# Patient Record
Sex: MALE | Race: WHITE | NOT HISPANIC OR LATINO | URBAN - METROPOLITAN AREA
[De-identification: names, ages, dates, MRNs, and addresses within clinical notes are randomized per-mention and may not be internally consistent; named-entity substitution may affect disease eponyms.]

---

## 2020-03-09 ENCOUNTER — PATIENT MESSAGE (OUTPATIENT)
Dept: ADMINISTRATIVE | Facility: OTHER | Age: 50
End: 2020-03-09

## 2020-03-09 ENCOUNTER — CLINICAL SUPPORT (OUTPATIENT)
Dept: OTHER | Facility: OTHER | Age: 50
End: 2020-03-09

## 2020-03-09 VITALS
DIASTOLIC BLOOD PRESSURE: 90 MMHG | HEIGHT: 78 IN | WEIGHT: 315 LBS | SYSTOLIC BLOOD PRESSURE: 140 MMHG | BODY MASS INDEX: 36.45 KG/M2

## 2020-03-09 DIAGNOSIS — I10 ESSENTIAL HYPERTENSION: Chronic | ICD-10-CM

## 2020-03-09 PROBLEM — E78.5 DYSLIPIDEMIA: Status: ACTIVE | Noted: 2018-04-11

## 2020-03-09 PROBLEM — Z72.0 TOBACCO ABUSE: Status: ACTIVE | Noted: 2018-04-11

## 2020-03-09 RX ORDER — ATORVASTATIN CALCIUM 40 MG/1
40 TABLET, FILM COATED ORAL DAILY
COMMUNITY
Start: 2020-02-07 | End: 2020-08-05

## 2020-03-09 RX ORDER — LOSARTAN POTASSIUM 100 MG/1
100 TABLET ORAL DAILY
COMMUNITY
Start: 2020-02-07 | End: 2020-08-05

## 2020-03-09 RX ORDER — AMLODIPINE BESYLATE 10 MG/1
10 TABLET ORAL DAILY
COMMUNITY
Start: 2020-02-07 | End: 2020-08-05

## 2020-03-09 RX ORDER — HYDROCODONE BITARTRATE AND ACETAMINOPHEN 10; 325 MG/1; MG/1
1 TABLET ORAL EVERY 4 HOURS PRN
COMMUNITY

## 2020-03-09 NOTE — PROGRESS NOTES
Digital Medicine: Video Consult    2020  48 yo male presents for enrollment into [facility]. States diagnosed a few years ago and SBP usually in 140s.  Smoker of 1 ppd, not interested in quitting. Only exercise is work.  Uses energy drinks a few times per week.  Recently established with Dr. Shlomo Soriano in [place].      Hypertension Consult   This is a chronic problem. The current episode started more than 1 year ago. The problem is unchanged. The problem is uncontrolled. There are no associated agents to hypertension. Risk factors for coronary artery disease include family history, dyslipidemia, male gender, obesity and smoking/tobacco exposure. Past treatments include angiotensin blockers and calcium channel blockers. The current treatment provides mild improvement.       Patient Active Problem List   Diagnosis    Essential hypertension    Dyslipidemia    Tobacco abuse       Past Medical History:   Diagnosis Date    Hyperlipidemia     Hypertension     Obesity     Sleep apnea     not currently on CPAP       Family History   Problem Relation Age of Onset    Hypertension Mother     Lupus Mother     Coronary artery disease Father          at age 71 of [place]    No Known Problems Daughter     No Known Problems Son        Social History     Socioeconomic History    Marital status:      Spouse name: Not on file    Number of children: Not on file    Years of education: Not on file    Highest education level: Not on file   Occupational History    Not on file   Social Needs    Financial resource strain: Not hard at all    Food insecurity:     Worry: Not on file     Inability: Not on file    Transportation needs:     Medical: Not on file     Non-medical: Not on file   Tobacco Use    Smoking status: Current Every Day Smoker     Types: Cigarettes    Smokeless tobacco: Never Used   Substance and Sexual Activity    Alcohol use: Yes     Frequency: Monthly or less    Drug use: Never    Sexual  activity: Not on file   Lifestyle    Physical activity:     Days per week: 0 days     Minutes per session: Not on file    Stress: Not on file   Relationships    Social connections:     Talks on phone: Not on file     Gets together: Not on file     Attends [employer] service: Not on file     Active member of club or organization: Not on file     Attends meetings of clubs or organizations: Not on file     Relationship status: Not on file   Other Topics Concern    Not on file   Social History Narrative    Not on file       Review of patient's allergies indicates:  No Known Allergies      Current Outpatient Medications:     amLODIPine (NORVASC) 10 MG tablet, Take 10 mg by mouth once daily., Disp: , Rfl:     atorvastatin (LIPITOR) 40 MG tablet, Take 40 mg by mouth once daily., Disp: , Rfl:     HYDROcodone-acetaminophen (NORCO)  mg per tablet, Take 1 tablet by mouth every 4 (four) hours as needed., Disp: , Rfl:     losartan (COZAAR) 100 MG tablet, Take 100 mg by mouth once daily., Disp: , Rfl:     oxyMORphone (OPANA ER) 20 mg TR12, Take 1 tablet by mouth 3 (three) times daily., Disp: , Rfl:         Patient is eligible for enrollment.  Enrollment completed.            Assessment & Plan      Essential Hypertension I10  Patient to be enrolled in the Hypertension Digital Medicine Program.   - An [employer] digital blood pressure cuff will be provided for at-home use.  Patient will be trained to use the device and start recording the blood pressure readings.   - With each blood pressure measurement, the patient's data automatically integrates with [employer], [facility]'s electronic medical record system.    - The patient's Care Team monitors the results and intervenes with medication or lifestyle modifications as needed.   - The patient and his/her physician (if applicable) receive customized monthly reports to track progress.

## 2020-04-02 ENCOUNTER — PATIENT OUTREACH (OUTPATIENT)
Dept: OTHER | Facility: OTHER | Age: 50
End: 2020-04-02

## 2020-04-02 NOTE — LETTER
May 5, 2020     Miguel Angel Crowell  [address]  [place] [address]       Dear Miguel Angel,    [facility] Our goal is to make care effective, proactive and convenient by using data you send us from home to better treat your chronic conditions.              My name is Ana CastroRonaldoNguyen, and I am your dedicated Digital Medicine clinician. As an expert in medication management, I will help ensure that the medications you are taking continue to provide the intended benefits and help you reach your goals. You can reach me directly at [phone] or by sending me a message directly through your [facility] account.      I am Sandra Candelario and I will be your health . My job is to help you identify lifestyle changes to improve your disease control. We will talk about nutrition, exercise, and other ways you may be able to adjust your current habits to better your health. Additionally, we will help ensure you are completing the tests and screenings that are necessary to help manage your conditions. You can reach me directly at [phone] or by sending me a message directly through your [facility] account.    Most importantly, YOU are at the center of this team. Together, we will work to improve your overall health and encourage you to meet your goals for a healthier lifestyle.     What we expect from YOU:  · Please take frequent home blood pressure measurements. We ask that you take at least 1 blood pressure reading per week, but more information will better help us get you know you. Be sure you rest for a few minutes before taking the reading in a quiet, comfortable place.     Be available to receive phone calls or [employer] messages, when appropriate, from your care team. Please let us know if there are any specific days or times that work best for us to reach you via phone.     Complete routine tests and screenings. Dont worry, we will help keep you on track!            What you should expect from your Digital Medicine Care Team:   We will work with you to create a personalized plan of care and provide you with encouragement and education, including regarding lifestyle changes, that could help you manage your disease states.     We will adjust your current medications, if needed, and continue to monitor your long-term progress.     We will provide you and your physician with monthly progress reports after you have been in the program for more than 30 days.     We will send you reminders through [email or web address] and text messages to help ensure you do not miss any testing deadlines to help manage your disease states.    You will be able to reach us by phone or through your [facility] account by clicking our names under Care Team on the right side of the home screen.    I look forward to working with you to achieve your blood pressure goals!    We look forward to working with you to help manage your health,    Sincerely,    Your Digital Medicine Team    Please visit our websites to learn more:   · Hypertension: [email or web address]      Remember, we are not available for emergencies. If you have an emergency, please contact your doctors office directly or call [facility] ([phone] or [phone]) or [phone].

## 2020-05-05 NOTE — PROGRESS NOTES
Digital Medicine: Health  Introduction    Introduced Miguel Angel Crowell to Digital Medicine. Discussed health  role and recommended lifestyle modifications.    The history is provided by the patient. No  was used.     HYPERTENSION  Our goal is to get BP to consistently below 130/80mmHg and make the process convenient so patient can avoid extra trips to the office. Getting your blood pressure below 130/80mmHg (definition of control) will reduce your risk for heart attack, kidney failure, stroke and death (as well as kidney failure, eye disease, & dementia)      Reviewed that the Digital Medicine care team - consisting of a clinician and a health  - will follow the most current evidence-based national guidelines for treating your condition.  The health  will focus on lifestyle modifications and motivation while the clinician will focus on medication therapy.  The care team will review all data on a regular basis and reach out as needed.      Explained that one of the key parts of the program is communication with the care team.  Asked patient to respond to outreach attempts and complete questionnaires.  Stressed importance of medication adherence.    Reviewed non-pharmacologic therapies and impact on BP.      Explained that we expect patient to obtain several blood pressures per week at random times of day.  Instructed patient not to allow anyone else to use phone and monitoring device.  Confirmed appropriate BP monitoring technique.      Explained to patient that the digital medicine team is not available for emergencies.  Patient will call [facility] on-call ([phone] or [phone]) or [phone] if needed.          Last 5 Patient Entered Readings                                      Current 30 Day Average:      Recent Readings 3/30/2020    SBP (mmHg) 145    DBP (mmHg) 96    Pulse 94        INTERVENTION(S)  encouragement/support, denied resources and denied  questions    PLAN  patient verbalizes understanding, Clinician follow-up and continue monitoring          Topic    Lipid (Cholesterol) Test        Reviewed the importance of self-monitoring, medication adherence, and that the health  can be used as a resource for lifestyle modifications to help reduce or maintain a healthy lifestyle.    Sent link to [facility] [employer] webpages and my contact information via [email or web address] for future questions. Follow up scheduled.    Screenings    I will follow-up in a couple of weeks to complete health  introduction.

## 2020-05-22 ENCOUNTER — PATIENT OUTREACH (OUTPATIENT)
Dept: OTHER | Facility: OTHER | Age: 50
End: 2020-05-22

## 2020-05-22 NOTE — PROGRESS NOTES
05/22/2020 11:55 AM   Patient is currently at goal, Avg BP is 128/79. Defer intro call at this time. HC reached out to pt today, no apparent med changes required. Will reach out in 1 week, sooner if BP begins to trend upward or downward.    Last 5 Patient Entered Readings                                      Current 30 Day Average: 128/79     Recent Readings 5/20/2020 5/6/2020 3/30/2020    SBP (mmHg) 119 137 145    DBP (mmHg) 69 89 96    Pulse 88 94 94        Hypertension Medications             amLODIPine (NORVASC) 10 MG tablet Take 10 mg by mouth once daily.    losartan (COZAAR) 100 MG tablet Take 100 mg by mouth once daily.

## 2020-05-22 NOTE — PROGRESS NOTES
Digital Medicine: Health  Follow-Up    The history is provided by the patient. No  was used.     Follow Up  Follow-up reason(s): routine education      Routine Education Topics: eating patterns and physical activity        INTERVENTION(S)  encouragement/support, denied resources and denied questions    PLAN  patient verbalizes understanding, patient amenable to changes and continue monitoring          Topic    Lipid (Cholesterol) Test        Last 5 Patient Entered Readings                                      Current 30 Day Average: 128/79     Recent Readings 5/20/2020 5/6/2020 3/30/2020    SBP (mmHg) 119 137 145    DBP (mmHg) 69 89 96    Pulse 88 94 94            Diet Screening       The patient states that he typically eats a non-home cooked meal (ex: dining out, take out, frozen meals) 3-4 times per week.  He cooks for self and has meals prepared by family.    Patient does the shopping for groceries and lets family grocery shop.  He gets groceries from the grocery store.      He finds cooking difficult.      Patient does not monitor sodium intake. Patient reports he dines out 3-4 times per week due to running out of cooking ideas. Offered cooking recipes for patient, sending via e-mail. Patient reports when they do cook he will smoke meat in his smoker and have fresh vegetables. Patient reports he does not read food labels. Patient reports he would like to focus on monitoring sodium intake. Patient reports over the next few weeks he will be more conscious of sodium intake.    Intervention(s): reducing sodium intake and reading food labels    Assigning the following patient goals: maintain low sodium diet    Physical Activity Screening   When asked if exercising, patient responded: unable    He identified the following barriers to physical activity: limited mobility and pain/injury/recent surgery    Patient reports he has been dealing with back pain since 2006. Patient has 2 herniated  "disc. Patient was previously in pain management and saw great benefits but can no longer go due to change of insurance. Patient states, "I was pain free". Patient reports he did physical therapy for 2 years ([id number]). Patient reports the therapy could only get him to a certain level. Patient currently does not exercise because the doctors are concerned about the disc rupturing. Patient reports he will sometimes do yard work.     Medication Adherence Screening       No questions at this time.     Tobacco and Alcohol Screening   Is patient considering quitting smoking? noPatient previously attempted to quit 2 time(s).    Method(s) to quit previously: gum and patch    Patient reports he has been smoking since he was 17 years old. Patient reports he has attempted to quit smoking twice. Once with the patches but made him nauseated and then once with the gum. Patient reports the gum did not help. Patient reports the longest he has been smoke free was 2-3 days because of a surgery. Patient has no interest in quitting at this time. Patient states, "I want to be pain free before I quit".       Patient is eligible for referral to smoking cessation.      Patient reports no alcohol use.       SDOH- Deferred.   "

## 2020-05-29 NOTE — PROGRESS NOTES
Digital Medicine: Clinician Introduction    Miguel Angel Crowell is a 49 y.o. male who is newly enrolled in the Digital Medicine Clinic.    The following information was reviewed and updated:  Preferred pharmacy   [facility]/pharmacy #[id number] - [place], [place] - [address]  [address]  [place] [address]  Phone: [phone] Fax: [phone]      Patient prefers a 90 days supply.     Review of patient's allergies indicates:  No Known Allergies    Called patient to follow up. Patient endorses adherence to medication regimen. Patient denies hypotensive s/sx (lightheadedness, dizziness, nausea, fatigue); patient denies hypertensive s/sx (SOB, CP, severe headaches, changes in vision). Instructed patient to seek medical care if BP > 180/110 and is accompanied by hypertensive s/sx associated, patient confirms understanding.     Brief conversation. Pt does not have any questions or concerns about HTN. Reviewed BP monitoring technique, encouraged pt to take readings at least once a week following proper technique. Pt verbalized understanding. Pt agreed to f/u with health  about improvements in nutrition.     Patient denies having questions or concerns. Patient has my contact information and knows to call with any concerns or clinical changes.        The history is provided by the patient. No  was used.     HYPERTENSION  Our goal is to get BP to consistently below 130/80mmHg and make the process convenient so patient can avoid extra trips to the office. Getting your blood pressure below 130/80mmHg (definition of control) will reduce your risk for heart attack, kidney failure, stroke and death (as well as kidney failure, eye disease, & dementia)      Reviewed non-pharmacologic therapies and impact on BP      Explained that we expect patient to obtain several blood pressures per week at random times of day.  Instructed patient not to allow anyone else to use phone and monitoring device.  Confirmed  appropriate BP monitoring technique.      Explained to patient that the digital medicine team is not available for emergencies.  Patient will call [facility] on-call ([phone] or [phone]) or [phone] if needed.    Patient's BP goal is 130/80. Patients BP average is 128/79 mmHg, which is at or below goal, per 2017 ACC/AHA Hypertension Guidelines.        Assessment:  Reviewed recent readings. Per 2017 ACC/ AHA HTN guidelines (goal of BP < 130/80), current 30-day average is well controlled.       Med Review complete.    Allergies reviewed.      Last 5 Patient Entered Readings                                      Current 30 Day Average: 128/79     Recent Readings 5/20/2020 5/6/2020 3/30/2020    SBP (mmHg) 119 137 145    DBP (mmHg) 69 89 96    Pulse 88 94 94            INTERVENTION(S)  reviewed appropriate dose schedule, reviewed monitoring technique and encouragement/support    PLAN  patient verbalizes understanding and continue monitoring    Continue current medication regimen. I will continue to monitor regularly and will follow-up in 2 to 3 months, sooner if blood pressure begins to trend upward or downward.           Topic    Lipid (Cholesterol) Test        Current Medication Regimen:  Hypertension Medications             amLODIPine (NORVASC) 10 MG tablet Take 10 mg by mouth once daily.    losartan (COZAAR) 100 MG tablet Take 100 mg by mouth once daily.            Reviewed the importance of self-monitoring, medication adherence, and that the health  can be used as a resource for lifestyle modifications to help reduce or maintain a healthy lifestyle.    Sent link to [facility] webpages and my contact information via [employer] for future questions. Follow up scheduled.         Screenings

## 2020-07-01 ENCOUNTER — PATIENT OUTREACH (OUTPATIENT)
Dept: OTHER | Facility: OTHER | Age: 50
End: 2020-07-01

## 2020-11-04 ENCOUNTER — PATIENT MESSAGE (OUTPATIENT)
Dept: ADMINISTRATIVE | Facility: OTHER | Age: 50
End: 2020-11-04